# Patient Record
Sex: MALE | Race: BLACK OR AFRICAN AMERICAN
[De-identification: names, ages, dates, MRNs, and addresses within clinical notes are randomized per-mention and may not be internally consistent; named-entity substitution may affect disease eponyms.]

---

## 2021-12-09 ENCOUNTER — HOSPITAL ENCOUNTER (OUTPATIENT)
Dept: HOSPITAL 61 - RAD | Age: 51
End: 2021-12-09
Attending: PHYSICIAN ASSISTANT
Payer: COMMERCIAL

## 2021-12-09 DIAGNOSIS — R06.00: Primary | ICD-10-CM

## 2021-12-09 PROCEDURE — 71046 X-RAY EXAM CHEST 2 VIEWS: CPT

## 2021-12-09 NOTE — RAD
AP and Lateral Views of the Chest  12/9/2021 3:14 PM



Indication: Reason: DYSPNEA ON EXERTION. / Spl. Instructions:  / History: 



Comparison: None



Findings: There is no focal consolidation or infiltrate identified. The cardiomediastinal silhouette 
is within normal limits. There is no evidence of pneumothorax or pleural effusion. No acute osseous a
bnormalities are identified.  



Impression: No evidence of acute cardiopulmonary process. 



Electronically signed by: Andrae Bah MD (12/9/2021 3:28 PM) [id number]